# Patient Record
Sex: FEMALE | Race: WHITE
[De-identification: names, ages, dates, MRNs, and addresses within clinical notes are randomized per-mention and may not be internally consistent; named-entity substitution may affect disease eponyms.]

---
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
## 2019-01-18 ENCOUNTER — HOSPITAL ENCOUNTER (OUTPATIENT)
Dept: HOSPITAL 61 - US | Age: 24
Discharge: HOME | End: 2019-01-18
Attending: FAMILY MEDICINE
Payer: COMMERCIAL

## 2019-01-18 DIAGNOSIS — M79.89: Primary | ICD-10-CM

## 2019-01-18 PROCEDURE — 93971 EXTREMITY STUDY: CPT

## 2019-01-18 NOTE — RAD
Left lower extremity venous ultrasound, 1/18/2019 :

 

History: Left leg pain and swelling

 

Duplex evaluation including grayscale, color flow and spectral Doppler 

analysis was performed.  The  femoral and popliteal veins show no filling 

defects to suggest DVT.   The visualized calf veins are unremarkable.

 

 

IMPRESSION:   There is no sonographic evidence of deep vein thrombosis in 

the left lower extremity

 

Electronically signed by: Rick Moritz, MD (1/18/2019 4:52 PM) [facility]

## 2019-04-17 ENCOUNTER — HOSPITAL ENCOUNTER (OUTPATIENT)
Dept: HOSPITAL 61 - CT | Age: 24
End: 2019-04-17
Attending: FAMILY MEDICINE
Payer: COMMERCIAL

## 2019-04-17 DIAGNOSIS — R11.2: ICD-10-CM

## 2019-04-17 DIAGNOSIS — N83.292: Primary | ICD-10-CM

## 2019-04-17 PROCEDURE — 74177 CT ABD & PELVIS W/CONTRAST: CPT

## 2019-04-17 NOTE — RAD
CT of the abdomen and pelvis with contrast, 4/17/2019:

 

HISTORY: Abdominal pain, diarrhea, vomiting

 

Multidetector CT imaging was performed following oral and IV 

administration of contrast.

 

No hepatic abnormality is detected. The gallbladder is unremarkable. The 

pancreas shows no abnormality. The spleen is of normal size. No renal 

abnormality is detected.

 

The uterus is within normal limits in size and deviated to the right of 

midline. A 2.4 cm cystic structure in the left adnexa is probably an 

ovarian cyst. The urinary bladder is mildly distended. No retroperitoneal 

or pelvic adenopathy is seen.

 

There is a paucity of intra-abdominal fat  the bowel loops in 

this patient. The bowel loops are not dilated. Multiple small mesenteric 

lymph nodes are seen without definite pathologic enlargement. The oral 

contrast material has not reached the terminal ileum or cecum. The 

appendix is definitively visualized. No dilated appendix or pericecal 

inflammatory process is seen. No free fluid or free air is evident in the 

abdomen or pelvis.

 

IMPRESSION:

1. Small left ovarian cyst.

2. The abdomen and pelvis are otherwise unremarkable.

 

 

PQRS Compliance Statement:

 

One or more of the following individualized dose reduction techniques were

utilized for this examination:

1. Automated exposure control

2. Adjustment of the mA and/or kV according to patient size

3. Use of iterative reconstruction technique

 

Electronically signed by: Rick Moritz, MD (4/17/2019 4:28 PM) [facility]